# Patient Record
Sex: MALE | Race: WHITE
[De-identification: names, ages, dates, MRNs, and addresses within clinical notes are randomized per-mention and may not be internally consistent; named-entity substitution may affect disease eponyms.]

---

## 2019-11-13 ENCOUNTER — HOSPITAL ENCOUNTER (OUTPATIENT)
Dept: HOSPITAL 92 - LABBT | Age: 44
Discharge: HOME | End: 2019-11-13
Attending: ORTHOPAEDIC SURGERY
Payer: COMMERCIAL

## 2019-11-13 DIAGNOSIS — M75.101: ICD-10-CM

## 2019-11-13 DIAGNOSIS — Z01.818: Primary | ICD-10-CM

## 2019-11-13 LAB
ANION GAP SERPL CALC-SCNC: 10 MMOL/L (ref 10–20)
BASOPHILS # BLD AUTO: 0.1 THOU/UL (ref 0–0.2)
BASOPHILS NFR BLD AUTO: 1.5 % (ref 0–1)
BUN SERPL-MCNC: 8 MG/DL (ref 8.9–20.6)
CALCIUM SERPL-MCNC: 9.5 MG/DL (ref 7.8–10.44)
CHLORIDE SERPL-SCNC: 107 MMOL/L (ref 98–107)
CO2 SERPL-SCNC: 27 MMOL/L (ref 22–29)
CREAT CL PREDICTED SERPL C-G-VRATE: 0 ML/MIN (ref 70–130)
EOSINOPHIL # BLD AUTO: 0.2 THOU/UL (ref 0–0.7)
EOSINOPHIL NFR BLD AUTO: 2.3 % (ref 0–10)
GLUCOSE SERPL-MCNC: 94 MG/DL (ref 70–105)
HGB BLD-MCNC: 16.8 G/DL (ref 14–18)
LYMPHOCYTES # BLD: 2.5 THOU/UL (ref 1.2–3.4)
LYMPHOCYTES NFR BLD AUTO: 31.2 % (ref 21–51)
MCH RBC QN AUTO: 30.2 PG (ref 27–31)
MCV RBC AUTO: 89.6 FL (ref 78–98)
MONOCYTES # BLD AUTO: 0.6 THOU/UL (ref 0.11–0.59)
MONOCYTES NFR BLD AUTO: 7.3 % (ref 0–10)
NEUTROPHILS # BLD AUTO: 4.6 THOU/UL (ref 1.4–6.5)
NEUTROPHILS NFR BLD AUTO: 57.6 % (ref 42–75)
PLATELET # BLD AUTO: 308 THOU/UL (ref 130–400)
POTASSIUM SERPL-SCNC: 5 MMOL/L (ref 3.5–5.1)
RBC # BLD AUTO: 5.55 MILL/UL (ref 4.7–6.1)
SODIUM SERPL-SCNC: 139 MMOL/L (ref 136–145)
WBC # BLD AUTO: 8 THOU/UL (ref 4.8–10.8)

## 2019-11-13 PROCEDURE — 80048 BASIC METABOLIC PNL TOTAL CA: CPT

## 2019-11-13 PROCEDURE — 85025 COMPLETE CBC W/AUTO DIFF WBC: CPT

## 2019-11-13 PROCEDURE — 93010 ELECTROCARDIOGRAM REPORT: CPT

## 2019-11-13 PROCEDURE — 93005 ELECTROCARDIOGRAM TRACING: CPT

## 2019-11-15 ENCOUNTER — HOSPITAL ENCOUNTER (OUTPATIENT)
Dept: HOSPITAL 92 - SDC | Age: 44
Discharge: HOME | End: 2019-11-15
Attending: ORTHOPAEDIC SURGERY
Payer: COMMERCIAL

## 2019-11-15 VITALS — BODY MASS INDEX: 29.8 KG/M2

## 2019-11-15 DIAGNOSIS — S43.431A: Primary | ICD-10-CM

## 2019-11-15 DIAGNOSIS — I10: ICD-10-CM

## 2019-11-15 DIAGNOSIS — M25.311: ICD-10-CM

## 2019-11-15 DIAGNOSIS — M25.811: ICD-10-CM

## 2019-11-15 DIAGNOSIS — Z79.891: ICD-10-CM

## 2019-11-15 DIAGNOSIS — G89.18: ICD-10-CM

## 2019-11-15 DIAGNOSIS — Z79.899: ICD-10-CM

## 2019-11-15 DIAGNOSIS — F90.9: ICD-10-CM

## 2019-11-15 DIAGNOSIS — M75.111: ICD-10-CM

## 2019-11-15 PROCEDURE — C1713 ANCHOR/SCREW BN/BN,TIS/BN: HCPCS

## 2019-11-15 PROCEDURE — 0LS10ZZ REPOSITION RIGHT SHOULDER TENDON, OPEN APPROACH: ICD-10-PCS | Performed by: ORTHOPAEDIC SURGERY

## 2019-11-15 PROCEDURE — A4306 DRUG DELIVERY SYSTEM <=50 ML: HCPCS

## 2019-11-15 PROCEDURE — 3E0T3BZ INTRODUCTION OF ANESTHETIC AGENT INTO PERIPHERAL NERVES AND PLEXI, PERCUTANEOUS APPROACH: ICD-10-PCS | Performed by: ORTHOPAEDIC SURGERY

## 2019-11-15 PROCEDURE — 0RNJ4ZZ RELEASE RIGHT SHOULDER JOINT, PERCUTANEOUS ENDOSCOPIC APPROACH: ICD-10-PCS | Performed by: ORTHOPAEDIC SURGERY

## 2019-11-15 PROCEDURE — 0RHJ04Z INSERTION OF INTERNAL FIXATION DEVICE INTO RIGHT SHOULDER JOINT, OPEN APPROACH: ICD-10-PCS | Performed by: ORTHOPAEDIC SURGERY

## 2019-11-17 NOTE — EKG
Test Reason : 

Blood Pressure : ***/*** mmHG

Vent. Rate : 069 BPM     Atrial Rate : 069 BPM

   P-R Int : 142 ms          QRS Dur : 106 ms

    QT Int : 366 ms       P-R-T Axes : 036 099 004 degrees

   QTc Int : 392 ms

 

Normal sinus rhythm

Rightward axis

Borderline ECG

No previous ECGs available

Confirmed by DR. ARTURO PIERRE ([id number]) on 11/17/2019 4:11:03 PM

 

Referred By:  IERO           Confirmed By:DR. ARTURO PIERRE

## 2019-11-18 NOTE — OP
DATE OF PROCEDURE:  11/15/2019



PREOPERATIVE DIAGNOSES:  

1. Right shoulder superior labral tear from anterior to posterior tear with biceps

instability. 

2. Right shoulder impingement.



POSTOPERATIVE DIAGNOSES:  

1. Right shoulder superior labral tear from anterior to posterior tear with biceps

instability. 

2. Right shoulder impingement.



PROCEDURE PERFORMED:  

1. Right shoulder arthroscopy with subacromial decompression.

2. Right shoulder open biceps tenodesis.



ASSISTANT:  Sea Broderick PA-C



ESTIMATED BLOOD LOSS:  Minimal.



COMPLICATIONS:  None.



ANESTHESIA:  He had general anesthetic as well as preoperative block.



IMPLANTS:  A 7 x 23 Biocomposite Bio-Tenodesis screw.



DISPOSITION:  He went to recovery room in stable condition.



INDICATIONS:  This is a 44-year-old male who has been having problems with his

shoulder for a number of months and failed nonoperative treatment including

injections and therapy.  At this time, he opted to have surgery. 



DESCRIPTION OF PROCEDURE:  After all appropriate consents were explained and signed,

he was taken back to the operating, at this time was given a general anesthetic.

Once the level of anesthesia was appropriate, he was rolled into the left lateral

decubitus position with all bony prominences well padded.  Axillary roll was placed

underneath the left axilla.  Beanbag was inflated to hold in this position.  All

bony prominences were well padded.  The arm was then taken through full range of

motion.  The arm was then suspended in standard arthroscopic fashion with 15 pounds.

 The right shoulder and upper extremity were then prepped and draped in standard

surgical fashion.  Bony anatomical landmarks were then drawn out and the subacromial

space was infiltrated with Marcaine with epinephrine.  Posterior portal was

established.  Scope was placed into the shoulder joint and an anterior working

portal was made using a needle localization technique.  Diagnostic arthroscopy was

commenced.  We found that there were no loose bodies in the axillary pouch.  The

articular surface of the humeral head and glenoid were in excellent condition.

Rotator cuff insertion on the bottom side looked to be in excellent condition.

There was an unstable superior labral anterior-posterior tear noted, which

destabilize the biceps tendon.  There was a significant amount of synovitic change

above the biceps tendon indicating an area where the biceps had been moving more

than normal and this continued down the bicipital groove as far as the camera could

visualize.  Remaining labrum was found to be in good condition other than the

swollen area anteriorly.  This area was debrided with a shaver.  At this time, a

needle was used to place a hitch through the biceps tendon and the biceps was then

cut off the superior labral insertion using the SERFAS energy.  At this time, scope

was repositioned in the subacromial space.  Bursa was removed from off the

underlying cuff.  The cuff was found to be intact.  A small bony decompression was

then performed using the SERFAS energy as well as the shaver.  At this time, the

scope was removed.  Shoulder was drained.  We then performed an open biceps

tenodesis.  A 15 blade was used to incise the skin.  Bovie was used to coagulate any

brisk venous bleeding.  Sharp entrance into and through the deltoid fascia was

performed and finger dissection was used in line with the deltoid fibers to get down

to the underlying transverse humeral ligament.  This was opened up and any brisk

venous bleeding coagulated.  Tendon was sutured.  Intra-articular portion was cut

off and removed.  We then placed our guide pin followed by reaming with a 7-mm

reamer and then placing a 7 x 23 Bio[employer]posite Bio-Tenodesis screw in standard

fashion.  Sutures were tied over top of the screws as the screw could not back out.

At this time, we then allowed the deltoid to close upon itself.  A running Vicryl

was used to close the deltoid fascia after the wound was thoroughly irrigated and

dried.  2-0 Vicryl and nylon stitches 

were used on the skin.  Each portal was then closed with a simple nylon stitch.

Bulky sterile dressing was applied to the right upper extremity.  The patient was

then 

awakened.  He was taken to recovery room in stable condition.  All counts were

correct at the end of the case and he did receive preoperative IV antibiotics. 







Job ID:  [id number]

## 2020-07-31 ENCOUNTER — HOSPITAL ENCOUNTER (OUTPATIENT)
Dept: HOSPITAL 92 - TBSIIMAG | Age: 45
Discharge: HOME | End: 2020-07-31
Attending: ANESTHESIOLOGY
Payer: COMMERCIAL

## 2020-07-31 DIAGNOSIS — M48.02: ICD-10-CM

## 2020-07-31 DIAGNOSIS — M89.38: ICD-10-CM

## 2020-07-31 DIAGNOSIS — M47.22: Primary | ICD-10-CM

## 2020-07-31 PROCEDURE — 72141 MRI NECK SPINE W/O DYE: CPT

## 2020-07-31 NOTE — MRI
MRI CERVICAL SPINE WITHOUT CONTRAST:

 

Date:  07/31/2020

 

INDICATION:

45-year-old male with cervical radiculopathy; patient experiencing chronic neck pain with right upper
 extremity radiculopathy with pain and tingling extending from the neck to the hands with numbness in
 the fingers. Patient has had history of shoulder surgery. No comparison MRIs are available. 

 

TECHNIQUE:  

Multiplanar, multisequence MR images were obtained of the cervical spine without IV contrast. No radi
ographic or MR comparisons are available. 

 

FINDINGS:

The visualized prevertebral and paravertebral soft tissues appear within normal limits. There is a 1.
0 cm sebaceous cyst overlying the midline cervical spine. This is best seen on image 8 of series 3. 

 

The bone marrow signal intensity appears within normal limits. Spinal alignment appears within normal
 limits. 

 

Visualized aspects of the posterior fossa appear within normal limits. 

 

Craniocervical junction appears within normal limits. 

 

At the C2-C3 level, there is mild facet joint degenerative change, left greater than right, but no ap
preciable central canal or neural foraminal narrowing. 

 

At C3-4, there is mild facet joint degenerative change, but no appreciable central canal or neural fo
raminal narrowing. 

 

At C4-5, there is mild facet joint degenerative change, but no appreciable central canal or neural fo
raminal narrowing. 

 

At C5-6, there is a broad based disc bulge with uncovertebral hypertrophy, right greater than left, i
nducing severe right and mild left neural foraminal narrowing. The broad based disc bulge does efface
 the ventral subarachnoid space and causes mild ventral indentation on the spinal cord without cord s
ignal abnormality. 

 

At C6-7, there is a mild broad based bulge with uncovertebral hypertrophy, greater on the right. Ther
e is facet joint degenerative change. There is moderate right neural foraminal narrowing. 

 

At C7-T1, there is no appreciable central canal or neural foraminal narrowing. 

 

IMPRESSION: 

1.  Mild central canal narrowing with mild ventral effacement of the spinal cord at C5-6. There is se
joshua right neural foraminal narrowing at C5-6. 

 

2.  Moderate right neural foraminal narrowing at C6-7 due to uncovertebral hypertrophy and facet dege
nerative change. 

 

 

 

 

POS: SHELBY

## 2021-12-02 ENCOUNTER — HOSPITAL ENCOUNTER (OUTPATIENT)
Dept: HOSPITAL 92 - CSHMRI | Age: 46
Discharge: HOME | End: 2021-12-02
Attending: OTOLARYNGOLOGY
Payer: COMMERCIAL

## 2021-12-02 DIAGNOSIS — G93.89: ICD-10-CM

## 2021-12-02 DIAGNOSIS — H90.5: Primary | ICD-10-CM

## 2021-12-02 PROCEDURE — 70553 MRI BRAIN STEM W/O & W/DYE: CPT

## 2022-06-29 ENCOUNTER — HOSPITAL ENCOUNTER (OUTPATIENT)
Dept: HOSPITAL 92 - CSHMRI | Age: 47
Discharge: HOME | End: 2022-06-29
Attending: NEUROLOGICAL SURGERY
Payer: COMMERCIAL

## 2022-06-29 DIAGNOSIS — D32.9: Primary | ICD-10-CM

## 2022-06-29 DIAGNOSIS — D32.0: ICD-10-CM

## 2022-06-29 PROCEDURE — 70553 MRI BRAIN STEM W/O & W/DYE: CPT

## 2022-06-29 PROCEDURE — A9579 GAD-BASE MR CONTRAST NOS,1ML: HCPCS

## 2023-08-23 ENCOUNTER — HOSPITAL ENCOUNTER (OUTPATIENT)
Dept: HOSPITAL 92 - SDC | Age: 48
Discharge: HOME | End: 2023-08-23
Attending: NEUROLOGICAL SURGERY
Payer: COMMERCIAL

## 2023-08-23 VITALS — BODY MASS INDEX: 29.9 KG/M2

## 2023-08-23 DIAGNOSIS — G43.909: ICD-10-CM

## 2023-08-23 DIAGNOSIS — G40.909: ICD-10-CM

## 2023-08-23 DIAGNOSIS — Z79.899: ICD-10-CM

## 2023-08-23 DIAGNOSIS — M54.12: Primary | ICD-10-CM

## 2023-08-23 DIAGNOSIS — Z87.891: ICD-10-CM

## 2023-08-23 PROCEDURE — C1713 ANCHOR/SCREW BN/BN,TIS/BN: HCPCS

## 2023-08-23 PROCEDURE — C1889 IMPLANT/INSERT DEVICE, NOC: HCPCS

## 2023-08-23 PROCEDURE — 0RG2070 FUSION OF 2 OR MORE CERVICAL VERTEBRAL JOINTS WITH AUTOLOGOUS TISSUE SUBSTITUTE, ANTERIOR APPROACH, ANTERIOR COLUMN, OPEN APPROACH: ICD-10-PCS | Performed by: NEUROLOGICAL SURGERY

## 2023-08-23 PROCEDURE — 0RG20A0 FUSION OF 2 OR MORE CERVICAL VERTEBRAL JOINTS WITH INTERBODY FUSION DEVICE, ANTERIOR APPROACH, ANTERIOR COLUMN, OPEN APPROACH: ICD-10-PCS | Performed by: NEUROLOGICAL SURGERY
